# Patient Record
Sex: MALE | Race: WHITE | NOT HISPANIC OR LATINO | Employment: FULL TIME | URBAN - NONMETROPOLITAN AREA
[De-identification: names, ages, dates, MRNs, and addresses within clinical notes are randomized per-mention and may not be internally consistent; named-entity substitution may affect disease eponyms.]

---
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
## 2018-01-10 NOTE — MISCELLANEOUS
Message  Return to work or school:      He is able to return to school on 03/21/2016     ANTONIETA Roman/AF  Signatures   Electronically signed by : ANTONIETA Roman; Mar 26 2016  3:42PM EST                       (Author)    Electronically signed by :  ANTONIETA Roman; Mar 26 2016  3:42PM EST                       (Author)

## 2018-01-13 VITALS
OXYGEN SATURATION: 98 % | DIASTOLIC BLOOD PRESSURE: 68 MMHG | RESPIRATION RATE: 17 BRPM | BODY MASS INDEX: 25.12 KG/M2 | WEIGHT: 202 LBS | SYSTOLIC BLOOD PRESSURE: 110 MMHG | HEIGHT: 75 IN | TEMPERATURE: 97.7 F | HEART RATE: 98 BPM

## 2018-01-13 VITALS
HEIGHT: 75 IN | DIASTOLIC BLOOD PRESSURE: 72 MMHG | BODY MASS INDEX: 24.12 KG/M2 | RESPIRATION RATE: 18 BRPM | OXYGEN SATURATION: 98 % | HEART RATE: 93 BPM | SYSTOLIC BLOOD PRESSURE: 118 MMHG | WEIGHT: 194 LBS | TEMPERATURE: 96.4 F

## 2018-11-06 ENCOUNTER — OFFICE VISIT (OUTPATIENT)
Dept: FAMILY MEDICINE CLINIC | Facility: CLINIC | Age: 18
End: 2018-11-06
Payer: COMMERCIAL

## 2018-11-06 VITALS
SYSTOLIC BLOOD PRESSURE: 120 MMHG | DIASTOLIC BLOOD PRESSURE: 80 MMHG | OXYGEN SATURATION: 98 % | RESPIRATION RATE: 18 BRPM | BODY MASS INDEX: 28.58 KG/M2 | HEIGHT: 72 IN | HEART RATE: 58 BPM | TEMPERATURE: 97.2 F | WEIGHT: 211 LBS

## 2018-11-06 DIAGNOSIS — M54.50 ACUTE MIDLINE LOW BACK PAIN WITHOUT SCIATICA: Primary | ICD-10-CM

## 2018-11-06 PROCEDURE — 99213 OFFICE O/P EST LOW 20 MIN: CPT | Performed by: FAMILY MEDICINE

## 2018-11-06 RX ORDER — PREDNISONE 10 MG/1
TABLET ORAL
Qty: 18 TABLET | Refills: 0 | Status: SHIPPED | OUTPATIENT
Start: 2018-11-06

## 2018-11-06 NOTE — PROGRESS NOTES
OFFICE VISIT  Ozzy Hillman 19 y o  male MRN: [id number]      Assessment / Plan:  Diagnoses and all orders for this visit:    Acute midline low back pain without sciatica  -     XR spine lumbar minimum 4 views non injury; Future  -     Nerve Stimulator (TENS THERAPY PAIN RELIEF) MARY; 1 Device by Does not apply route 2 (two) times a day  -     predniSONE 10 mg tablet; 30 mg by mouth daily for 3 days, then 20 mg by mouth daily for 3 days, then 10 mg by mouth daily for 3 days, then stop          Reason For Visit / Chief Complaint  Chief Complaint   Patient presents with    Back Pain     He states it has been going on for a while        HPI:  Ozzy Hillman is a 19 y o  male who presents today for chronic back pain  He reports having pain since 10year old  He reports this pain is isolated in the lower back  He reports this pain is 10/10 when it occurs  He reports having an episode on Sunday  He reports power lifting and felt a pull in his lower back  Historical Information   Past Medical History:   Diagnosis Date    ASD (atrial septal defect)     Asthma     GERD (gastroesophageal reflux disease)     Migraine     Syncope      Past Surgical History:   Procedure Laterality Date    MYRINGOTOMY W/ TUBES      NOSE SURGERY       Social History   History   Alcohol Use No     History   Drug Use No     History   Smoking Status    Never Smoker   Smokeless Tobacco    Never Used     No family history on file      Meds/Allergies   Allergies   Allergen Reactions    Isoflavones     Shellfish Allergy     Latex Rash       Meds:    Current Outpatient Prescriptions:     Nerve Stimulator (TENS THERAPY PAIN RELIEF) MARY, 1 Device by Does not apply route 2 (two) times a day, Disp: 1 Device, Rfl: 0    predniSONE 10 mg tablet, 30 mg by mouth daily for 3 days, then 20 mg by mouth daily for 3 days, then 10 mg by mouth daily for 3 days, then stop, Disp: 18 tablet, Rfl: 0      REVIEW OF SYSTEMS  Review of Systems Constitutional: Negative for appetite change, fatigue and fever  HENT: Negative for congestion, ear discharge, ear pain and postnasal drip  Eyes: Negative for pain, discharge, redness, itching and visual disturbance  Respiratory: Negative for chest tightness, shortness of breath and wheezing  Cardiovascular: Negative for chest pain, palpitations and leg swelling  Gastrointestinal: Negative for abdominal distention, abdominal pain, blood in stool, diarrhea, nausea and vomiting  Endocrine: Negative for cold intolerance, heat intolerance, polydipsia, polyphagia and polyuria  Genitourinary: Negative for decreased urine volume, difficulty urinating, dysuria, frequency, hematuria, testicular pain and urgency  Musculoskeletal: Positive for arthralgias and back pain  Negative for myalgias, neck pain and neck stiffness  Skin: Negative for color change, pallor, rash and wound  Neurological: Negative for dizziness, light-headedness, numbness and headaches  Hematological: Negative for adenopathy  Does not bruise/bleed easily  Psychiatric/Behavioral: Negative for agitation, behavioral problems, self-injury, sleep disturbance and suicidal ideas  The patient is not nervous/anxious  Current Vitals:   Blood Pressure: 120/80 (11/06/18 1135)  Pulse: 58 (11/06/18 1135)  Temperature: (!) 97 2 °F (36 2 °C) (11/06/18 1135)  Respirations: 18 (11/06/18 1135)  Height: 6' (182 9 cm) (11/06/18 1135)  Weight - Scale: 95 7 kg (211 lb) (11/06/18 1135)  SpO2: 98 % (11/06/18 1135)  [unfilled]    PHYSICAL EXAMS:  Physical Exam   Constitutional: He is oriented to person, place, and time  He appears well-developed and well-nourished  HENT:   Head: Normocephalic and atraumatic  Right Ear: External ear normal    Left Ear: External ear normal    Nose: Nose normal    Mouth/Throat: Oropharynx is clear and moist    Eyes: Pupils are equal, round, and reactive to light   Conjunctivae are normal  Right eye exhibits no discharge  Left eye exhibits no discharge  Neck: Normal range of motion  Neck supple  No thyromegaly present  Cardiovascular: Normal rate, regular rhythm and normal heart sounds  Pulmonary/Chest: Effort normal and breath sounds normal    Abdominal: Soft  Bowel sounds are normal  He exhibits no distension  There is no tenderness  Musculoskeletal: Normal range of motion  He exhibits tenderness  He exhibits no edema or deformity  Neurological: He is alert and oriented to person, place, and time  Skin: Skin is warm and dry  No rash noted  No erythema  Psychiatric: He has a normal mood and affect  His behavior is normal            Follow up at this office in 2 weeks    Counseling / Coordination of Care  Total floor / unit time spent today 20 minutes  Greater than 50% of total time was spent with the patient and / or family counseling and / or coordination of care

## 2019-11-26 ENCOUNTER — HOSPITAL ENCOUNTER (EMERGENCY)
Facility: HOSPITAL | Age: 19
Discharge: HOME | End: 2019-11-26
Attending: EMERGENCY MEDICINE | Admitting: EMERGENCY MEDICINE
Payer: COMMERCIAL

## 2019-11-26 VITALS
OXYGEN SATURATION: 97 % | TEMPERATURE: 98.2 F | RESPIRATION RATE: 18 BRPM | BODY MASS INDEX: 30.79 KG/M2 | DIASTOLIC BLOOD PRESSURE: 69 MMHG | HEART RATE: 86 BPM | HEIGHT: 72 IN | SYSTOLIC BLOOD PRESSURE: 135 MMHG | WEIGHT: 227.29 LBS

## 2019-11-26 DIAGNOSIS — H66.91 RIGHT OTITIS MEDIA: Primary | ICD-10-CM

## 2019-11-26 PROCEDURE — 99282 EMERGENCY DEPT VISIT SF MDM: CPT

## 2019-11-26 PROCEDURE — 99284 EMERGENCY DEPT VISIT MOD MDM: CPT | Performed by: EMERGENCY MEDICINE

## 2019-11-26 RX ORDER — AMOXICILLIN AND CLAVULANATE POTASSIUM 875; 125 MG/1; MG/1
1 TABLET, FILM COATED ORAL ONCE
Status: COMPLETED | OUTPATIENT
Start: 2019-11-26 | End: 2019-11-26

## 2019-11-26 RX ORDER — AMOXICILLIN AND CLAVULANATE POTASSIUM 875; 125 MG/1; MG/1
1 TABLET, FILM COATED ORAL EVERY 12 HOURS
Qty: 14 TABLET | Refills: 0 | Status: SHIPPED | OUTPATIENT
Start: 2019-11-26 | End: 2019-12-03

## 2019-11-26 RX ADMIN — AMOXICILLIN AND CLAVULANATE POTASSIUM 1 TABLET: 875; 125 TABLET, FILM COATED ORAL at 00:53

## 2019-11-26 NOTE — ED PROVIDER NOTES
History  Chief Complaint   Patient presents with    Earache     rigth ear ache, started about 2 weeks ago  This is a 47-year-old male who presents with a right-sided earache  The patient states that for the past 2 weeks, he has been experiencing right ear fullness and pain  The pain has been getting worse over the past 2 weeks  He has been taking Motrin for pain relief which seems to help  He has also been applying Debrox drops  The patient states that he does have a history of recurrent ear infections  No recent swimming  He also complains of a mild right-sided sore throat  Denies fever/chills, nausea/vomiting, lightheadedness/dizziness, numbness/weakness, headache, change in vision, URI symptoms, neck pain, chest pain, palpitations, shortness of breath, cough, back pain, flank pain, abdominal pain, diarrhea, hematochezia, melena, dysuria, hematuria  Prior to Admission Medications   Prescriptions Last Dose Informant Patient Reported? Taking? Nerve Stimulator (TENS THERAPY PAIN RELIEF) MARY   No No   Si Device by Does not apply route 2 (two) times a day   predniSONE 10 mg tablet   No No   Si mg by mouth daily for 3 days, then 20 mg by mouth daily for 3 days, then 10 mg by mouth daily for 3 days, then stop      Facility-Administered Medications: None       Past Medical History:   Diagnosis Date    ASD (atrial septal defect)     Asthma     GERD (gastroesophageal reflux disease)     Migraine     Syncope        Past Surgical History:   Procedure Laterality Date    MYRINGOTOMY W/ TUBES      NOSE SURGERY         History reviewed  No pertinent family history  I have reviewed and agree with the history as documented  Social History     Tobacco Use    Smoking status: Never Smoker    Smokeless tobacco: Never Used   Substance Use Topics    Alcohol use: No    Drug use: No        Review of Systems   Constitutional: Negative for chills and fever     HENT: Positive for ear pain and sore throat  Negative for congestion, rhinorrhea and trouble swallowing  Respiratory: Negative for cough, chest tightness, shortness of breath and wheezing  Cardiovascular: Negative for chest pain and palpitations  Gastrointestinal: Negative for abdominal pain, blood in stool, diarrhea, nausea and vomiting  Musculoskeletal: Negative for back pain and neck pain  All other systems reviewed and are negative  Physical Exam  Physical Exam   Constitutional: Vital signs are normal  He appears well-developed and well-nourished  He is cooperative  Non-toxic appearance  He does not appear ill  HENT:   Head: Normocephalic  Right Ear: Hearing, external ear and ear canal normal  No mastoid tenderness  Tympanic membrane is erythematous  A middle ear effusion is present  Left Ear: Hearing, tympanic membrane, external ear and ear canal normal  No mastoid tenderness  No middle ear effusion  Mouth/Throat: Uvula is midline, oropharynx is clear and moist and mucous membranes are normal  He does not have dentures  No oral lesions  No trismus in the jaw  Normal dentition  No dental abscesses, uvula swelling, lacerations or dental caries  No oropharyngeal exudate, posterior oropharyngeal edema, posterior oropharyngeal erythema or tonsillar abscesses  No tonsillar exudate  Eyes: Pupils are equal, round, and reactive to light  Conjunctivae, EOM and lids are normal    Cardiovascular: Normal rate, regular rhythm, normal heart sounds, intact distal pulses and normal pulses  Pulses:       Radial pulses are 2+ on the right side, and 2+ on the left side  Pulmonary/Chest: Effort normal and breath sounds normal    Abdominal: Soft  Normal appearance and bowel sounds are normal  There is no tenderness  There is no rigidity, no rebound, no guarding and no CVA tenderness  Neurological: He is alert  Psychiatric: He has a normal mood and affect   His speech is normal and behavior is normal        Vital Signs  ED Triage Vitals [11/26/19 0046]   Temperature Pulse Respirations Blood Pressure SpO2   98 2 °F (36 8 °C) 86 18 135/69 97 %      Temp Source Heart Rate Source Patient Position - Orthostatic VS BP Location FiO2 (%)   Temporal Monitor Sitting Right arm --      Pain Score       --           Vitals:    11/26/19 0046   BP: 135/69   Pulse: 86   Patient Position - Orthostatic VS: Sitting         Visual Acuity      ED Medications  Medications   amoxicillin-clavulanate (AUGMENTIN) 875-125 mg per tablet 1 tablet (1 tablet Oral Given 11/26/19 0053)       Diagnostic Studies  Results Reviewed     None                 No orders to display              Procedures  Procedures       ED Course                               MDM  Number of Diagnoses or Management Options  Diagnosis management comments: Likely otitis media  Will treat with Augmentin  The patient does not have a family doctor  Will give information for ENT  Strict return precautions given  Disposition  Final diagnoses:   Right otitis media     Time reflects when diagnosis was documented in both MDM as applicable and the Disposition within this note     Time User Action Codes Description Comment    11/26/2019 12:53 AM Marisa FULLER Add [H66 90] Acute otitis media, unspecified otitis media type     11/26/2019 12:53 AM Caterina Levy Add [H66 91] Right otitis media     11/26/2019 12:53 AM Caterina Levy Modify [H66 91] Right otitis media     11/26/2019 12:53 AM Marisa FULLER Remove [H66 90] Acute otitis media, unspecified otitis media type       ED Disposition     ED Disposition Condition Date/Time Comment    Discharge Stable Tue Nov 26, 2019 12:52 AM Atilio Marquis discharge to home/self care              Follow-up Information     Follow up With Specialties Details [facility] Go to  If symptoms worsen [address] [id number] [phone] [place] ED, [place] [address], [facility], [address]    Darnell Dietz MD Otolaryngology, Plastic Surgery Schedule an appointment as soon as possible for a visit  if symptoms do not improve within 1 week [address] [place] [address]  [phone]             Patient's Medications   Discharge Prescriptions    AMOXICILLIN-CLAVULANATE (AUGMENTIN) 875-125 MG PER TABLET    Take 1 tablet by mouth every 12 (twelve) hours for 7 days       Start Date: 11/26/2019End Date: 12/3/2019       Order Dose: 1 tablet       Quantity: 14 tablet    Refills: 0     No discharge procedures on file      ED Provider  Electronically Signed by           Zulma Guzman MD  11/26/19 6636

## 2021-09-07 ENCOUNTER — APPOINTMENT (EMERGENCY)
Dept: CT IMAGING | Facility: HOSPITAL | Age: 21
End: 2021-09-07
Payer: COMMERCIAL

## 2021-09-07 ENCOUNTER — APPOINTMENT (EMERGENCY)
Dept: RADIOLOGY | Facility: HOSPITAL | Age: 21
End: 2021-09-07
Payer: COMMERCIAL

## 2021-09-07 ENCOUNTER — HOSPITAL ENCOUNTER (EMERGENCY)
Facility: HOSPITAL | Age: 21
Discharge: HOME | End: 2021-09-07
Attending: EMERGENCY MEDICINE | Admitting: EMERGENCY MEDICINE
Payer: COMMERCIAL

## 2021-09-07 VITALS
SYSTOLIC BLOOD PRESSURE: 126 MMHG | HEIGHT: 72 IN | DIASTOLIC BLOOD PRESSURE: 74 MMHG | HEART RATE: 91 BPM | BODY MASS INDEX: 30.76 KG/M2 | TEMPERATURE: 98 F | WEIGHT: 227.07 LBS | OXYGEN SATURATION: 97 % | RESPIRATION RATE: 14 BRPM

## 2021-09-07 DIAGNOSIS — N17.9 AKI (ACUTE KIDNEY INJURY) (HCC): ICD-10-CM

## 2021-09-07 DIAGNOSIS — S40.012A CONTUSION OF LEFT SHOULDER: ICD-10-CM
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
## 2021-09-07 DIAGNOSIS — S70.02XA CONTUSION OF LEFT HIP: ICD-10-CM

## 2021-09-07 DIAGNOSIS — T07.XXXA MULTIPLE ABRASIONS: ICD-10-CM

## 2021-09-07 LAB
ALBUMIN SERPL BCP-MCNC: 4.3 G/DL (ref 3.5–5)
ALP SERPL-CCNC: 70 U/L (ref 46–116)
ALT SERPL W P-5'-P-CCNC: 24 U/L (ref 12–78)
ANION GAP SERPL CALCULATED.3IONS-SCNC: 8 MMOL/L (ref 4–13)
AST SERPL W P-5'-P-CCNC: 30 U/L (ref 5–45)
BASOPHILS # BLD AUTO: 0.09 THOUSANDS/ΜL (ref 0–0.1)
BASOPHILS NFR BLD AUTO: 1 % (ref 0–1)
BILIRUB SERPL-MCNC: 1.33 MG/DL (ref 0.2–1)
BUN SERPL-MCNC: 20 MG/DL (ref 5–25)
CALCIUM SERPL-MCNC: 8.5 MG/DL (ref 8.3–10.1)
CHLORIDE SERPL-SCNC: 104 MMOL/L (ref 100–108)
CO2 SERPL-SCNC: 30 MMOL/L (ref 21–32)
CREAT SERPL-MCNC: 1.54 MG/DL (ref 0.6–1.3)
EOSINOPHIL # BLD AUTO: 0.07 THOUSAND/ΜL (ref 0–0.61)
EOSINOPHIL NFR BLD AUTO: 1 % (ref 0–6)
ERYTHROCYTE [DISTWIDTH] IN BLOOD BY AUTOMATED COUNT: 11.8 % (ref 11.6–15.1)
GFR SERPL CREATININE-BSD FRML MDRD: 63 ML/MIN/1.73SQ M
GLUCOSE SERPL-MCNC: 100 MG/DL (ref 65–140)
HCT VFR BLD AUTO: 44.7 % (ref 36.5–49.3)
HGB BLD-MCNC: 15 G/DL (ref 12–17)
IMM GRANULOCYTES # BLD AUTO: 0.02 THOUSAND/UL (ref 0–0.2)
IMM GRANULOCYTES NFR BLD AUTO: 0 % (ref 0–2)
LYMPHOCYTES # BLD AUTO: 1.26 THOUSANDS/ΜL (ref 0.6–4.47)
LYMPHOCYTES NFR BLD AUTO: 12 % (ref 14–44)
MCH RBC QN AUTO: 32.5 PG (ref 26.8–34.3)
MCHC RBC AUTO-ENTMCNC: 33.6 G/DL (ref 31.4–37.4)
MCV RBC AUTO: 97 FL (ref 82–98)
MONOCYTES # BLD AUTO: 0.89 THOUSAND/ΜL (ref 0.17–1.22)
MONOCYTES NFR BLD AUTO: 9 % (ref 4–12)
NEUTROPHILS # BLD AUTO: 8.02 THOUSANDS/ΜL (ref 1.85–7.62)
NEUTS SEG NFR BLD AUTO: 77 % (ref 43–75)
NRBC BLD AUTO-RTO: 0 /100 WBCS
PLATELET # BLD AUTO: 283 THOUSANDS/UL (ref 149–390)
PMV BLD AUTO: 9.4 FL (ref 8.9–12.7)
POTASSIUM SERPL-SCNC: 4.5 MMOL/L (ref 3.5–5.3)
PROT SERPL-MCNC: 7.5 G/DL (ref 6.4–8.2)
RBC # BLD AUTO: 4.62 MILLION/UL (ref 3.88–5.62)
SODIUM SERPL-SCNC: 142 MMOL/L (ref 136–145)
WBC # BLD AUTO: 10.35 THOUSAND/UL (ref 4.31–10.16)

## 2021-09-07 PROCEDURE — G1004 CDSM NDSC: HCPCS

## 2021-09-07 PROCEDURE — 85025 COMPLETE CBC W/AUTO DIFF WBC: CPT | Performed by: EMERGENCY MEDICINE

## 2021-09-07 PROCEDURE — 73590 X-RAY EXAM OF LOWER LEG: CPT

## 2021-09-07 PROCEDURE — 36415 COLL VENOUS BLD VENIPUNCTURE: CPT | Performed by: EMERGENCY MEDICINE

## 2021-09-07 PROCEDURE — 72125 CT NECK SPINE W/O DYE: CPT

## 2021-09-07 PROCEDURE — 71045 X-RAY EXAM CHEST 1 VIEW: CPT

## 2021-09-07 PROCEDURE — 70450 CT HEAD/BRAIN W/O DYE: CPT

## 2021-09-07 PROCEDURE — 73030 X-RAY EXAM OF SHOULDER: CPT

## 2021-09-07 PROCEDURE — 80053 COMPREHEN METABOLIC PANEL: CPT | Performed by: EMERGENCY MEDICINE

## 2021-09-07 PROCEDURE — 99285 EMERGENCY DEPT VISIT HI MDM: CPT

## 2021-09-07 PROCEDURE — 73564 X-RAY EXAM KNEE 4 OR MORE: CPT

## 2021-09-07 PROCEDURE — 73060 X-RAY EXAM OF HUMERUS: CPT

## 2021-09-07 PROCEDURE — 99285 EMERGENCY DEPT VISIT HI MDM: CPT | Performed by: EMERGENCY MEDICINE

## 2021-09-07 PROCEDURE — 74177 CT ABD & PELVIS W/CONTRAST: CPT

## 2021-09-07 RX ORDER — GINSENG 100 MG
1 CAPSULE ORAL ONCE
Status: COMPLETED | OUTPATIENT
Start: 2021-09-07 | End: 2021-09-07

## 2021-09-07 RX ADMIN — IOHEXOL 100 ML: 350 INJECTION, SOLUTION INTRAVENOUS at 20:02

## 2021-09-07 RX ADMIN — BACITRACIN 1 LARGE APPLICATION: 500 OINTMENT TOPICAL at 21:44
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
## 2021-09-08 NOTE — DISCHARGE INSTRUCTIONS
Plenty of fliuds  Aleve 1 tabs twice daily with food OR ibuprofen 200-400mg every 8 hours with food as needed for pain   Tylenol 650mg every 6 hours as needed for pain, fever (max 3000mg in 24 hours)   Ice or heat shoulder  Bacitracin to abrasions twice daily  Return with persistent pain not improving numbness tingling difficulty breathing lightheadedness any signs of wound infection to the abraded regions

## 2025-01-09 ENCOUNTER — HOSPITAL ENCOUNTER (EMERGENCY)
Facility: HOSPITAL | Age: 25
Discharge: HOME | End: 2025-01-09
Attending: EMERGENCY MEDICINE | Admitting: EMERGENCY MEDICINE
Payer: COMMERCIAL

## 2025-01-09 VITALS
SYSTOLIC BLOOD PRESSURE: 158 MMHG | WEIGHT: 245 LBS | RESPIRATION RATE: 18 BRPM | TEMPERATURE: 98.7 F | HEART RATE: 84 BPM | DIASTOLIC BLOOD PRESSURE: 70 MMHG | BODY MASS INDEX: 33.23 KG/M2 | OXYGEN SATURATION: 97 %

## 2025-01-09 DIAGNOSIS — T14.8XXA ANIMAL BITE: Primary | ICD-10-CM

## 2025-01-09 PROCEDURE — 90715 TDAP VACCINE 7 YRS/> IM: CPT | Performed by: EMERGENCY MEDICINE

## 2025-01-09 PROCEDURE — 90375 RABIES IG IM/SC: CPT | Performed by: EMERGENCY MEDICINE

## 2025-01-09 PROCEDURE — 90675 RABIES VACCINE IM: CPT | Performed by: EMERGENCY MEDICINE

## 2025-01-09 PROCEDURE — 99283 EMERGENCY DEPT VISIT LOW MDM: CPT

## 2025-01-09 PROCEDURE — 90471 IMMUNIZATION ADMIN: CPT

## 2025-01-09 PROCEDURE — 90472 IMMUNIZATION ADMIN EACH ADD: CPT

## 2025-01-09 PROCEDURE — 96372 THER/PROPH/DIAG INJ SC/IM: CPT

## 2025-01-09 RX ORDER — GINSENG 100 MG
1 CAPSULE ORAL ONCE
Status: COMPLETED | OUTPATIENT
Start: 2025-01-09 | End: 2025-01-09

## 2025-01-09 RX ADMIN — RABIES IMMUNE GLOBULIN (HUMAN) 2100 UNITS: 300 INJECTION, SOLUTION INFILTRATION; INTRAMUSCULAR at 18:46

## 2025-01-09 RX ADMIN — BACITRACIN ZINC 1 SMALL APPLICATION: 500 OINTMENT TOPICAL at 18:57

## 2025-01-09 RX ADMIN — TETANUS TOXOID, REDUCED DIPHTHERIA TOXOID AND ACELLULAR PERTUSSIS VACCINE, ADSORBED 0.5 ML: 5; 2.5; 8; 8; 2.5 SUSPENSION INTRAMUSCULAR at 18:40

## 2025-01-09 RX ADMIN — Medication 1 ML: at 18:44

## 2025-01-09 RX ADMIN — AMOXICILLIN AND CLAVULANATE POTASSIUM 1 TABLET: 875; 125 TABLET, FILM COATED ORAL at 18:39

## 2025-01-09 NOTE — ED PROVIDER NOTES
Time reflects when diagnosis was documented in both MDM as applicable and the Disposition within this note       Time User Action Codes Description Comment    1/9/2025  6:31 PM Addison Finley Add [T14.8XXA] Animal bite           ED Disposition       ED Disposition   Discharge    Condition   Stable    Date/Time   Thu Jan 9, 2025  6:56 PM    Comment   Yogi Coyle discharge to home/self care.                   Assessment & Plan       Medical Decision Making  24-year-old male presents the emergency department secondary to dog bite by the neighbors dog when the dog was stuck under the fence between the 2 properties.  The patient attempted to lift the fence to let the dog through, but the dog bit his right leg.  Patient's immunization status is unclear, the dog's immunization status is also unclear.  The patient notes that he does not trust the neighbors enough to believe that the dog has its rabies shots.  They have not yet provided any information about it.  The patient came to the hospital for evaluation.  On my evaluation there is a superficial puncture wound that does not need suturing on the right pretibial region as well as an abrasion from the dog's teeth.  The patient was given options of having the dog observed, finding out the rabies status of the dog or just getting the vaccine.  The patient requested the vaccine.  Patient was given rabies immunoglobulin and rabies vaccine while in the emergency department.  The patient tolerated this well.  The patient will come back on day 3 7 and 14 for the remainder of the series.  Augmentin was given in the ER as well as started for 5 days for protection against bacterial infection.  I injected 2 cc of rabies middle globulin around the bite wound.    Risk  OTC drugs.  Prescription drug management.             Medications   tetanus-diphtheria-acellular pertussis (BOOSTRIX) IM injection 0.5 mL (0.5 mL Intramuscular Given 1/9/25 9040)   amoxicillin-clavulanate  (AUGMENTIN) 875-125 mg per tablet 1 tablet (1 tablet Oral Given 1/9/25 1839)   rabies vaccine, human diploid IM injection 1 mL (1 mL Intramuscular Given 1/9/25 1844)   rabies immune globulin, human (HyperRAB) injection 2,100 Units (2,100 Units Infiltration Given 1/9/25 1846)   bacitracin topical ointment 1 small application (1 small application Topical Given 1/9/25 1857)       ED Risk Strat Scores                          SBIRT 20yo+      Flowsheet Row Most Recent Value   Initial Alcohol Screen: US AUDIT-C     1. How often do you have a drink containing alcohol? 0 Filed at: 01/09/2025 1727   2. How many drinks containing alcohol do you have on a typical day you are drinking?  0 Filed at: 01/09/2025 1727   3a. Male UNDER 65: How often do you have five or more drinks on one occasion? 0 Filed at: 01/09/2025 1727   3b. FEMALE Any Age, or MALE 65+: How often do you have 4 or more drinks on one occassion? 0 Filed at: 01/09/2025 1727   Audit-C Score 0 Filed at: 01/09/2025 1727   GABE: How many times in the past year have you...    Used an illegal drug or used a prescription medication for non-medical reasons? Never Filed at: 01/09/2025 1727                            History of Present Illness       Chief Complaint   Patient presents with    Animal Bite     Patient reports neighbors dog bit his right leg.        Past Medical History:   Diagnosis Date    ASD (atrial septal defect)     Asthma     GERD (gastroesophageal reflux disease)     Migraine     Syncope       Past Surgical History:   Procedure Laterality Date    MYRINGOTOMY W/ TUBES      NOSE SURGERY        History reviewed. No pertinent family history.   Social History     Tobacco Use    Smoking status: Never    Smokeless tobacco: Never   Substance Use Topics    Alcohol use: No    Drug use: No      E-Cigarette/Vaping      E-Cigarette/Vaping Substances      I have reviewed and agree with the history as documented.     24-year-old male presents the emergency room status  post dog bite by his neighbors dog which occurred about half hour prior to arrival.  Patient has a single puncture wound to the right leg.  Patient notes that this dog shots are not up to date he does not feel that his neighbors are trustworthy.  He states he gets multiple answers depending on who he asks.  The patient's significant other told him to come to the hospital.  Patient denies any fever chills or other complaints.  Patient stashes status is unclear.        Review of Systems   Constitutional:  Negative for chills and fever.   HENT:  Negative for ear pain and sore throat.    Eyes:  Negative for pain and visual disturbance.   Respiratory:  Negative for cough and shortness of breath.    Cardiovascular:  Negative for chest pain and palpitations.   Gastrointestinal:  Negative for abdominal pain and vomiting.   Genitourinary:  Negative for dysuria and hematuria.   Musculoskeletal:  Positive for myalgias. Negative for arthralgias and back pain.   Skin:  Positive for color change, rash and wound.   Neurological:  Negative for seizures and syncope.   All other systems reviewed and are negative.          Objective       ED Triage Vitals   Temperature Pulse Blood Pressure Respirations SpO2 Patient Position - Orthostatic VS   01/09/25 1727 01/09/25 1727 01/09/25 1727 01/09/25 1745 01/09/25 1727 --   98.7 °F (37.1 °C) 99 163/82 18 97 %       Temp Source Heart Rate Source BP Location FiO2 (%) Pain Score    01/09/25 1727 -- -- -- --    Oral          Vitals      Date and Time Temp Pulse SpO2 Resp BP Pain Score FACES Pain Rating User   01/09/25 1745 -- 84 97 % 18 158/70 -- -- LAXMI   01/09/25 1727 98.7 °F (37.1 °C) 99 97 % -- 163/82 -- -- AM            Physical Exam  Vitals and nursing note reviewed.   Constitutional:       General: He is not in acute distress.     Appearance: He is well-developed.   HENT:      Head: Normocephalic and atraumatic.   Eyes:      Conjunctiva/sclera: Conjunctivae normal.   Cardiovascular:       Rate and Rhythm: Normal rate and regular rhythm.      Heart sounds: No murmur heard.  Pulmonary:      Effort: Pulmonary effort is normal. No respiratory distress.      Breath sounds: Normal breath sounds.   Abdominal:      Palpations: Abdomen is soft.      Tenderness: There is no abdominal tenderness.   Musculoskeletal:         General: Tenderness present. No swelling.      Cervical back: Neck supple.      Right lower leg: No edema.      Left lower leg: No edema.   Skin:     General: Skin is warm and dry.      Capillary Refill: Capillary refill takes less than 2 seconds.   Neurological:      Mental Status: He is alert and oriented to person, place, and time.   Psychiatric:         Mood and Affect: Mood normal.         Results Reviewed       None            No orders to display       Procedures    ED Medication and Procedure Management   Prior to Admission Medications   Prescriptions Last Dose Informant Patient Reported? Taking?   Nerve Stimulator (TENS THERAPY PAIN RELIEF) MARY   No No   Si Device by Does not apply route 2 (two) times a day   predniSONE 10 mg tablet   No No   Si mg by mouth daily for 3 days, then 20 mg by mouth daily for 3 days, then 10 mg by mouth daily for 3 days, then stop      Facility-Administered Medications: None     Discharge Medication List as of 2025  6:56 PM        CONTINUE these medications which have CHANGED    Details   amoxicillin-clavulanate (AUGMENTIN) 875-125 mg per tablet Take 1 tablet by mouth every 12 (twelve) hours for 5 days, Starting Thu 2025, Until 2025, Normal           CONTINUE these medications which have NOT CHANGED    Details   Nerve Stimulator (TENS THERAPY PAIN RELIEF) MARY 1 Device by Does not apply route 2 (two) times a day, Starting 2018, Normal      predniSONE 10 mg tablet 30 mg by mouth daily for 3 days, then 20 mg by mouth daily for 3 days, then 10 mg by mouth daily for 3 days, then stop, Normal           No discharge  procedures on file.  ED SEPSIS DOCUMENTATION   Time reflects when diagnosis was documented in both MDM as applicable and the Disposition within this note       Time User Action Codes Description Comment    1/9/2025  6:31 PM Addison Finley Add [T14.8XXA] Animal bite                  Addison Finley Jr.,   01/09/25 2055

## 2025-01-09 NOTE — DISCHARGE INSTRUCTIONS
Return to the ER on day 3, 7, and 14. (1/12, 1/16, and 1/23)    Wash wound daily with soap and water, apply bacitracin ointment and Band-Aid.    Take Augmentin 1 pill twice a day for 5 days to prevent infection.    Return to the ER with any new, concerning, or worsening issues.

## 2025-01-10 NOTE — ED NOTES
[facility] animal bite form completed and faxed to [phone]     Caroline Barahona RN  01/09/25 1914